# Patient Record
Sex: MALE | Race: WHITE | Employment: OTHER | URBAN - METROPOLITAN AREA
[De-identification: names, ages, dates, MRNs, and addresses within clinical notes are randomized per-mention and may not be internally consistent; named-entity substitution may affect disease eponyms.]

---

## 2018-08-10 PROBLEM — M24.20 LIGAMENT LAXITY: Status: ACTIVE | Noted: 2018-08-10

## 2018-08-10 PROBLEM — Z96.652 STATUS POST LEFT KNEE REPLACEMENT: Status: ACTIVE | Noted: 2018-08-10

## 2021-05-25 ENCOUNTER — HOSPITAL ENCOUNTER (EMERGENCY)
Age: 66
Discharge: HOME | End: 2021-05-25
Payer: COMMERCIAL

## 2021-05-25 ENCOUNTER — APPOINTMENT (OUTPATIENT)
Dept: CT IMAGING | Age: 66
End: 2021-05-25
Payer: COMMERCIAL

## 2021-05-25 VITALS
TEMPERATURE: 97.3 F | SYSTOLIC BLOOD PRESSURE: 127 MMHG | WEIGHT: 160 LBS | HEIGHT: 69 IN | DIASTOLIC BLOOD PRESSURE: 88 MMHG | HEART RATE: 98 BPM | OXYGEN SATURATION: 94 % | BODY MASS INDEX: 23.7 KG/M2 | RESPIRATION RATE: 16 BRPM
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
## 2021-05-25 DIAGNOSIS — S01.01XA LACERATION OF SCALP, INITIAL ENCOUNTER: ICD-10-CM

## 2021-05-25 DIAGNOSIS — S09.90XA CLOSED HEAD INJURY, INITIAL ENCOUNTER: Primary | ICD-10-CM

## 2021-05-25 PROCEDURE — 72125 CT NECK SPINE W/O DYE: CPT

## 2021-05-25 PROCEDURE — 6360000002 HC RX W HCPCS: Performed by: NURSE PRACTITIONER

## 2021-05-25 PROCEDURE — 90471 IMMUNIZATION ADMIN: CPT | Performed by: NURSE PRACTITIONER

## 2021-05-25 PROCEDURE — 70450 CT HEAD/BRAIN W/O DYE: CPT

## 2021-05-25 PROCEDURE — 2500000003 HC RX 250 WO HCPCS: Performed by: NURSE PRACTITIONER

## 2021-05-25 PROCEDURE — 96372 THER/PROPH/DIAG INJ SC/IM: CPT

## 2021-05-25 PROCEDURE — 90715 TDAP VACCINE 7 YRS/> IM: CPT | Performed by: NURSE PRACTITIONER

## 2021-05-25 PROCEDURE — 6370000000 HC RX 637 (ALT 250 FOR IP): Performed by: NURSE PRACTITIONER

## 2021-05-25 PROCEDURE — 12002 RPR S/N/AX/GEN/TRNK2.6-7.5CM: CPT

## 2021-05-25 PROCEDURE — 99283 EMERGENCY DEPT VISIT LOW MDM: CPT

## 2021-05-25 RX ORDER — DIAPER,BRIEF,INFANT-TODD,DISP
EACH MISCELLANEOUS ONCE
Status: COMPLETED | OUTPATIENT
Start: 2021-05-25 | End: 2021-05-25

## 2021-05-25 RX ORDER — LIDOCAINE HYDROCHLORIDE AND EPINEPHRINE 10; 10 MG/ML; UG/ML
20 INJECTION, SOLUTION INFILTRATION; PERINEURAL ONCE
Status: COMPLETED | OUTPATIENT
Start: 2021-05-25 | End: 2021-05-25

## 2021-05-25 RX ADMIN — Medication: at 21:05

## 2021-05-25 RX ADMIN — TETANUS TOXOID, REDUCED DIPHTHERIA TOXOID AND ACELLULAR PERTUSSIS VACCINE, ADSORBED 0.5 ML: 5; 2.5; 8; 8; 2.5 SUSPENSION INTRAMUSCULAR at 21:05

## 2021-05-25 RX ADMIN — LIDOCAINE HYDROCHLORIDE,EPINEPHRINE BITARTRATE 20 ML: 10; .01 INJECTION, SOLUTION INFILTRATION; PERINEURAL at 21:04

## 2021-05-25 ASSESSMENT — PAIN SCALES - GENERAL
PAINLEVEL_OUTOF10: 1
PAINLEVEL_OUTOF10: 1

## 2021-05-25 ASSESSMENT — PAIN DESCRIPTION - LOCATION: LOCATION: HEAD

## 2021-05-26 NOTE — ED PROVIDER NOTES
laceration of the frontal scalp, which measures 3.5cm. It is a partial thickness laceration. There is no evidence of foreign body or gross contamination. Neurologic: GCS 15, cranial nerves II through XII grossly intact. No acute neurovascular deficit noted. Speech clear and coherent strength strong and equal bilaterally  Psych: Normal Affect      ------------------------------ ED COURSE/MEDICAL DECISION MAKING----------------------  Medications   lidocaine-EPINEPHrine 1 %-1:[id number] injection 20 mL (20 mLs Intradermal Given 5/25/21 2104)   Tetanus-Diphth-Acell Pertussis (BOOSTRIX) injection 0.5 mL (0.5 mLs Intramuscular Given 5/25/21 2105)   bacitracin zinc ointment ( Topical Given 5/25/21 2105)             LACERATION REPAIR  PROCEDURE NOTE:  Unless otherwise indicated, this procedure was performed by Danitza Norton CNP       Laceration #: 1. Location: Frontal Scalp   Length: 3.5cm. The wound area was irrigated with sterile saline, cleansend with shur-clens and draped in a sterile fashion. The wound area was anesthetized with Lidocaine 1% with epinephrine. WOUND COMPLEXITY:    Debridement: partial thickness and None. Undermining: partial thickness and None. Wound Margins Revised: yes. Flaps Aligned: yes. The wound was explored with the following results No foreign bodies found, no foreign body or tendon injury seen. The wound was closed with staples. Dressing:  bacitracin was placed. Total number staples: 7      Medical Decision Making: Plan will be for imaging will also perform laceration repair, patient with CT scan of the brain and CT cervical spine both completely unremarkable. Laceration repair completed by this provider, 7 staples placed to frontal scalp. They were educated on daily wound care, signs symptoms of infection and good follow-up care and staple removal.  Patient able to ambulate easily and independently, no neurovascular deficit noted.   Patient expressed understanding of good follow-up care as well as when to return back to the emergency department. Patient safely discharged home        Counseling: The emergency provider has spoken with the patient and discussed todays results, in addition to providing specific details for the plan of care and counseling regarding the diagnosis and prognosis. Questions are answered at this time and they are agreeable with the plan.      --------------------------------- IMPRESSION AND DISPOSITION ---------------------------------    IMPRESSION  1. Closed head injury, initial encounter    2. Fall, initial encounter    3.  Laceration of scalp, initial encounter        DISPOSITION  Disposition: Discharge to home  Patient condition is good         ELIZABETH Castaneda CNP  05/26/21 6277

## 2024-11-18 ENCOUNTER — APPOINTMENT (OUTPATIENT)
Dept: PRIMARY CARE | Facility: CLINIC | Age: 69
End: 2024-11-18
Payer: MEDICARE

## 2024-11-18 VITALS
DIASTOLIC BLOOD PRESSURE: 90 MMHG | WEIGHT: 238 LBS | HEIGHT: 69 IN | HEART RATE: 92 BPM | TEMPERATURE: 97.8 F | BODY MASS INDEX: 35.25 KG/M2 | SYSTOLIC BLOOD PRESSURE: 180 MMHG | RESPIRATION RATE: 16 BRPM

## 2024-11-18 DIAGNOSIS — Z00.01 ENCOUNTER FOR GENERAL ADULT MEDICAL EXAMINATION WITH ABNORMAL FINDINGS: Primary | ICD-10-CM

## 2024-11-18 DIAGNOSIS — Z78.9 ALCOHOL USE: ICD-10-CM

## 2024-11-18 DIAGNOSIS — E78.5 HYPERLIPIDEMIA, UNSPECIFIED HYPERLIPIDEMIA TYPE: ICD-10-CM

## 2024-11-18 DIAGNOSIS — Z85.47 HX OF TESTICULAR CANCER: ICD-10-CM

## 2024-11-18 DIAGNOSIS — Z11.59 ENCOUNTER FOR HCV SCREENING TEST FOR LOW RISK PATIENT: ICD-10-CM

## 2024-11-18 DIAGNOSIS — E55.9 VITAMIN D DEFICIENCY: ICD-10-CM

## 2024-11-18 DIAGNOSIS — I10 HYPERTENSION, UNSPECIFIED TYPE: ICD-10-CM

## 2024-11-18 DIAGNOSIS — N40.1 BENIGN PROSTATIC HYPERPLASIA WITH URINARY FREQUENCY: ICD-10-CM

## 2024-11-18 DIAGNOSIS — R35.0 BENIGN PROSTATIC HYPERPLASIA WITH URINARY FREQUENCY: ICD-10-CM

## 2024-11-18 DIAGNOSIS — K21.9 GASTROESOPHAGEAL REFLUX DISEASE, UNSPECIFIED WHETHER ESOPHAGITIS PRESENT: ICD-10-CM

## 2024-11-18 DIAGNOSIS — I35.0 NONRHEUMATIC AORTIC (VALVE) STENOSIS: ICD-10-CM

## 2024-11-18 PROBLEM — M24.20 LIGAMENT LAXITY: Status: ACTIVE | Noted: 2018-08-10

## 2024-11-18 PROCEDURE — 1036F TOBACCO NON-USER: CPT | Performed by: FAMILY MEDICINE

## 2024-11-18 PROCEDURE — 3080F DIAST BP >= 90 MM HG: CPT | Performed by: FAMILY MEDICINE

## 2024-11-18 PROCEDURE — 3008F BODY MASS INDEX DOCD: CPT | Performed by: FAMILY MEDICINE

## 2024-11-18 PROCEDURE — 1159F MED LIST DOCD IN RCRD: CPT | Performed by: FAMILY MEDICINE

## 2024-11-18 PROCEDURE — G2211 COMPLEX E/M VISIT ADD ON: HCPCS | Performed by: FAMILY MEDICINE

## 2024-11-18 PROCEDURE — 3077F SYST BP >= 140 MM HG: CPT | Performed by: FAMILY MEDICINE

## 2024-11-18 PROCEDURE — 99204 OFFICE O/P NEW MOD 45 MIN: CPT | Performed by: FAMILY MEDICINE

## 2024-11-18 PROCEDURE — 1160F RVW MEDS BY RX/DR IN RCRD: CPT | Performed by: FAMILY MEDICINE

## 2024-11-18 RX ORDER — OMEPRAZOLE 40 MG/1
CAPSULE, DELAYED RELEASE ORAL
COMMUNITY
Start: 2024-11-03 | End: 2024-11-18 | Stop reason: SDUPTHER

## 2024-11-18 RX ORDER — LOSARTAN POTASSIUM 100 MG/1
100 TABLET ORAL DAILY
Qty: 90 TABLET | Refills: 3 | Status: SHIPPED | OUTPATIENT
Start: 2024-11-18 | End: 2025-11-18

## 2024-11-18 RX ORDER — ESCITALOPRAM OXALATE 10 MG/1
1 TABLET ORAL
COMMUNITY
Start: 2024-10-23

## 2024-11-18 RX ORDER — LOSARTAN POTASSIUM 100 MG/1
100 TABLET ORAL DAILY
COMMUNITY
End: 2024-11-18 | Stop reason: SDUPTHER

## 2024-11-18 RX ORDER — TAMSULOSIN HYDROCHLORIDE 0.4 MG/1
0.4 CAPSULE ORAL DAILY
COMMUNITY

## 2024-11-18 RX ORDER — OMEPRAZOLE 40 MG/1
40 CAPSULE, DELAYED RELEASE ORAL
Qty: 90 CAPSULE | Refills: 3 | Status: SHIPPED | OUTPATIENT
Start: 2024-11-18 | End: 2025-11-18

## 2024-11-18 RX ORDER — PRAVASTATIN SODIUM 20 MG/1
1 TABLET ORAL
COMMUNITY
Start: 2024-10-23

## 2024-11-18 RX ORDER — ALBUTEROL SULFATE 90 UG/1
INHALANT RESPIRATORY (INHALATION)
COMMUNITY
Start: 2024-10-24

## 2024-11-18 RX ORDER — METOPROLOL SUCCINATE 25 MG/1
TABLET, EXTENDED RELEASE ORAL
COMMUNITY
Start: 2024-10-23

## 2024-11-18 ASSESSMENT — LIFESTYLE VARIABLES
HOW OFTEN DO YOU HAVE SIX OR MORE DRINKS ON ONE OCCASION: DAILY OR ALMOST DAILY
HOW MANY STANDARD DRINKS CONTAINING ALCOHOL DO YOU HAVE ON A TYPICAL DAY: PATIENT DECLINED
AUDIT-C TOTAL SCORE: -1
SKIP TO QUESTIONS 9-10: 0
HOW OFTEN DO YOU HAVE A DRINK CONTAINING ALCOHOL: 4 OR MORE TIMES A WEEK

## 2024-11-18 NOTE — PATIENT INSTRUCTIONS
USE [email or web address].  CALL FOR NEEDS [phone].   KEEP ON MEDICATIONS  KEEP SPECIALTY APPOINTMENTS.  DR ESTRADA & DR FRAUSTO.    GET FASTED LABS.    KEEP UP WITH YOUR FLU AND COVID VACCINATIONS.

## 2024-11-18 NOTE — PROGRESS NOTES
"Subjective   Patient ID: Juan Hayden is a 68 y.o. male who presents for New Patient Visit.    HPI   NEW PT   [facility] DR MERLYN HAMILTON AND WANTS A NEW PCP.    SPECIALIST:    CARDIO DR FRAUSTO:  REMOTE VISIT   DR JOSE JUAN [facility] ON[facility] dx TESTICULAR CANCER 1985.            Review of Systems    Objective   /90 (BP Location: Left arm, Patient Position: Sitting)   Pulse 92   Temp 36.6 °C (97.8 °F) (Temporal)   Resp 16   Ht 1.753 m (5' 9\")   Wt 108 kg (238 lb)   BMI 35.15 kg/m²     Physical Exam    Assessment/Plan          "

## 2025-02-21 ENCOUNTER — APPOINTMENT (OUTPATIENT)
Dept: PRIMARY CARE | Facility: CLINIC | Age: 70
End: 2025-02-21
Payer: MEDICARE

## 2025-02-21 DIAGNOSIS — J40 BRONCHITIS: Primary | ICD-10-CM

## 2025-02-21 PROCEDURE — 99213 OFFICE O/P EST LOW 20 MIN: CPT | Performed by: FAMILY MEDICINE

## 2025-02-21 RX ORDER — AZITHROMYCIN 250 MG/1
TABLET, FILM COATED ORAL
Qty: 6 TABLET | Refills: 0 | Status: SHIPPED | OUTPATIENT
Start: 2025-02-21 | End: 2025-02-21 | Stop reason: ENTERED-IN-ERROR

## 2025-02-21 RX ORDER — ALBUTEROL SULFATE 90 UG/1
2 INHALANT RESPIRATORY (INHALATION) EVERY 4 HOURS PRN
Qty: 18 G | Refills: 11 | Status: SHIPPED | OUTPATIENT
Start: 2025-02-21 | End: 2025-02-21 | Stop reason: ENTERED-IN-ERROR

## 2025-02-21 RX ORDER — METHYLPREDNISOLONE 4 MG/1
TABLET ORAL
Qty: 21 TABLET | Refills: 0 | Status: SHIPPED | OUTPATIENT
Start: 2025-02-21 | End: 2025-02-21 | Stop reason: ENTERED-IN-ERROR

## 2025-02-21 NOTE — PROGRESS NOTES
Subjective   Patient ID: Juna Hayden is a 69 y.o. male who presents for No chief complaint on file..    HPI         Review of Systems     Objective   There were no vitals taken for this visit.    Physical Exam    Assessment/Plan   Assessment & Plan  Bronchitis           TODAY'S NOTE IS IN ERROR - PT NOT SEEN NOT TREATED VISIT WAS CANCELLED - PGS 2/21/25: 17:54

## 2025-02-25 ENCOUNTER — APPOINTMENT (OUTPATIENT)
Dept: PRIMARY CARE | Facility: CLINIC | Age: 70
End: 2025-02-25
Payer: MEDICARE

## 2025-02-25 VITALS
RESPIRATION RATE: 16 BRPM | SYSTOLIC BLOOD PRESSURE: 160 MMHG | DIASTOLIC BLOOD PRESSURE: 88 MMHG | HEART RATE: 65 BPM | TEMPERATURE: 97.8 F

## 2025-02-25 DIAGNOSIS — J40 BRONCHITIS: ICD-10-CM

## 2025-02-25 DIAGNOSIS — I10 HYPERTENSION, UNSPECIFIED TYPE: Primary | Chronic | ICD-10-CM

## 2025-02-25 DIAGNOSIS — Z78.9 ALCOHOL USE: ICD-10-CM

## 2025-02-25 DIAGNOSIS — Z85.47 HX OF TESTICULAR CANCER: ICD-10-CM

## 2025-02-25 PROCEDURE — 99214 OFFICE O/P EST MOD 30 MIN: CPT | Performed by: FAMILY MEDICINE

## 2025-02-25 PROCEDURE — 1159F MED LIST DOCD IN RCRD: CPT | Performed by: FAMILY MEDICINE

## 2025-02-25 PROCEDURE — G2211 COMPLEX E/M VISIT ADD ON: HCPCS | Performed by: FAMILY MEDICINE

## 2025-02-25 PROCEDURE — 3079F DIAST BP 80-89 MM HG: CPT | Performed by: FAMILY MEDICINE

## 2025-02-25 PROCEDURE — 1036F TOBACCO NON-USER: CPT | Performed by: FAMILY MEDICINE

## 2025-02-25 PROCEDURE — 1160F RVW MEDS BY RX/DR IN RCRD: CPT | Performed by: FAMILY MEDICINE

## 2025-02-25 PROCEDURE — 3077F SYST BP >= 140 MM HG: CPT | Performed by: FAMILY MEDICINE

## 2025-02-25 RX ORDER — AMLODIPINE BESYLATE 5 MG/1
5 TABLET ORAL
COMMUNITY
Start: 2025-01-16

## 2025-02-25 RX ORDER — HYDROCHLOROTHIAZIDE 25 MG/1
25 TABLET ORAL DAILY
Qty: 90 TABLET | Refills: 3 | Status: SHIPPED | OUTPATIENT
Start: 2025-02-25 | End: 2026-02-25

## 2025-02-25 ASSESSMENT — PATIENT HEALTH QUESTIONNAIRE - PHQ9
9. THOUGHTS THAT YOU WOULD BE BETTER OFF DEAD, OR OF HURTING YOURSELF: NOT AT ALL
6. FEELING BAD ABOUT YOURSELF - OR THAT YOU ARE A FAILURE OR HAVE LET YOURSELF OR YOUR FAMILY DOWN: SEVERAL DAYS
2. FEELING DOWN, DEPRESSED OR HOPELESS: MORE THAN HALF THE DAYS
4. FEELING TIRED OR HAVING LITTLE ENERGY: MORE THAN HALF THE DAYS
3. TROUBLE FALLING OR STAYING ASLEEP OR SLEEPING TOO MUCH: NOT AT ALL
7. TROUBLE CONCENTRATING ON THINGS, SUCH AS READING THE NEWSPAPER OR WATCHING TELEVISION: NOT AT ALL
SUM OF ALL RESPONSES TO PHQ9 QUESTIONS 1 AND 2: 3
1. LITTLE INTEREST OR PLEASURE IN DOING THINGS: SEVERAL DAYS
SUM OF ALL RESPONSES TO PHQ QUESTIONS 1-9: 7
5. POOR APPETITE OR OVEREATING: SEVERAL DAYS
8. MOVING OR SPEAKING SO SLOWLY THAT OTHER PEOPLE COULD HAVE NOTICED. OR THE OPPOSITE, BEING SO FIGETY OR RESTLESS THAT YOU HAVE BEEN MOVING AROUND A LOT MORE THAN USUAL: NOT AT ALL

## 2025-02-25 NOTE — PROGRESS NOTES
Subjective   Patient ID: Juan Hayden is a 69 y.o. male who presents for Follow-up (Fu from cardiologist and oncologist ).    HPI   Follow-up (Fu from cardiologist and oncologist ).  TESTICULAR MALIGNANCY.      LABS AND TESTS TO REVIEW.      REPORTS CONSTANT QD LUGO WITH THE NURSING HOME TRIGGERS HIS HTN.      BRONCHITIS 2.21.2025.  BETTER NOW, DRINKING A LOT LATELY.  WORRIED HE DRANK HIMSELF INTO AN ULCER..      NEW ONCO - ABRAMOVICH TO NEW ONCO:  DR JETT.    TESTICULAR CANCER S/P RADTX IN REMISSION.      CARDIO:    LUIS ENRIQUE BLANCAS.  /78: AMLODIPINE.    C/O INSANELY DIZZY WITH LAZY EYE.  BLUE SCREEN EXPOSURES?    NO FALLS.      $5000 ALKALINE WATER IN HOME.        LABS: eGFR 63, Cr 1.26;  ABNL UA, LOW D3; NEEDS VIT D3.       1--HTN NOT CONTROLLED DESPITE BB CCB ARB. tba HCTZ    2--DIZZINESS    3--TESTICULAR CANCER IN REMISSION  4--ALCOHOLISM PLEASE REDUCE USE...  5--NEAR FALLS BUT NO REAL FALLS, HX OF NEURO WORKUPS.   6--GERD AND ULCER RISKS TO AVOID THE BAD 5 GI IRRITANTS.       PT IS ON CCB BB ARB STATIN PPI PPX   NO MELENA.        Review of Systems   All other systems reviewed and are negative.      Objective   /88 (BP Location: Left arm, Patient Position: Lying)   Pulse 65   Temp 36.6 °C (97.8 °F) (Temporal)   Resp 16     Physical Exam  Vitals and nursing note reviewed.   Constitutional:       Appearance: Normal appearance.      Comments: LAZY EYE AND O/W WELL APPEARING WM.  NT NAD AND OBESE ABD NO HSM.     HENT:      Head: Normocephalic.      Right Ear: Tympanic membrane, ear canal and external ear normal.      Left Ear: Tympanic membrane, ear canal and external ear normal.      Nose: Nose normal.      Mouth/Throat:      Mouth: Mucous membranes are moist.      Pharynx: Oropharynx is clear.   Eyes:      Conjunctiva/sclera: Conjunctivae normal.      Pupils: Pupils are equal, round, and reactive to light.   Cardiovascular:      Rate and Rhythm: Normal rate and regular rhythm.      Pulses:  Normal pulses.      Heart sounds: Normal heart sounds.   Pulmonary:      Effort: Pulmonary effort is normal.      Breath sounds: Normal breath sounds.   Abdominal:      General: Bowel sounds are normal.      Palpations: Abdomen is soft.   Musculoskeletal:         General: Normal range of motion.      Cervical back: Normal range of motion and neck supple.   Skin:     General: Skin is warm and dry.   Neurological:      General: No focal deficit present.      Mental Status: Mental status is at baseline.   Psychiatric:         Mood and Affect: Mood normal.         Behavior: Behavior normal.         Thought Content: Thought content normal.         Judgment: Judgment normal.         Assessment/Plan   Assessment & Plan  Bronchitis         Alcohol use         Hx of testicular cancer         Hypertension, unspecified type    Orders:    hydroCHLOROthiazide (HYDRODiuril) 25 mg tablet; Take 1 tablet (25 mg) by mouth once daily.    Follow Up In Primary Care - Established; Future    Follow Up In Primary Care - Medicare Annual; Future    Follow Up In Primary Care - Nurse Visit; Future

## 2025-02-25 NOTE — ASSESSMENT & PLAN NOTE
Orders:    hydroCHLOROthiazide (HYDRODiuril) 25 mg tablet; Take 1 tablet (25 mg) by mouth once daily.    Follow Up In Primary Care - Established; Future    Follow Up In Primary Care - Medicare Annual; Future    Follow Up In Primary Care - Nurse Visit; Future

## 2025-02-25 NOTE — PATIENT INSTRUCTIONS
"USE MYCHART.  CALL FOR NEEDS [phone].   KEEP ON MEDICATIONS  KEEP SPECIALTY APPOINTMENTS.    1--BLOOD PRESSURE NOT CONTROLLED DESPITE METOPROLOL, AMLODIPINE, LOSARTAN.   WE ADDED HYDROCHLOROTHIAZIDE A MILD DIURETIC OR \"WATER PILL TO CONTINUE TO LOWER YU9OR BLOOD PRESSURE TO GAOL < 130/80.  2--DIZZINESS: NEAR FALLS BUT NO REAL FALLS, HX OF NEURO WORKUPS.     3--TESTICULAR CANCER IN REMISSION  4--ALCOHOLISM PLEASE REDUCE USE...  5--GERD AND ULCER RISKS TO AVOID THE BAD 5 GI IRRITANTS.       BLOOD PRESSURE CHECK ON ALL MEDS AND NEW HCTZ DOSE.         "

## 2025-03-11 ENCOUNTER — APPOINTMENT (OUTPATIENT)
Dept: PRIMARY CARE | Facility: CLINIC | Age: 70
End: 2025-03-11
Payer: MEDICARE

## 2025-03-11 VITALS
DIASTOLIC BLOOD PRESSURE: 70 MMHG | HEART RATE: 71 BPM | OXYGEN SATURATION: 95 % | TEMPERATURE: 98 F | HEIGHT: 69 IN | SYSTOLIC BLOOD PRESSURE: 132 MMHG | BODY MASS INDEX: 35.15 KG/M2

## 2025-03-11 DIAGNOSIS — K29.60 OTHER SPECIFIED GASTRITIS, PRESENCE OF BLEEDING UNSPECIFIED, UNSPECIFIED CHRONICITY: ICD-10-CM

## 2025-03-11 DIAGNOSIS — G47.33 OSA (OBSTRUCTIVE SLEEP APNEA): ICD-10-CM

## 2025-03-11 DIAGNOSIS — K21.9 GASTROESOPHAGEAL REFLUX DISEASE, UNSPECIFIED WHETHER ESOPHAGITIS PRESENT: ICD-10-CM

## 2025-03-11 DIAGNOSIS — R10.33 PERIUMBILICAL ABDOMINAL PAIN: Primary | ICD-10-CM

## 2025-03-11 DIAGNOSIS — E66.01 OBESITY, MORBID (MULTI): ICD-10-CM

## 2025-03-11 PROCEDURE — 3075F SYST BP GE 130 - 139MM HG: CPT | Performed by: FAMILY MEDICINE

## 2025-03-11 PROCEDURE — 1036F TOBACCO NON-USER: CPT | Performed by: FAMILY MEDICINE

## 2025-03-11 PROCEDURE — 99214 OFFICE O/P EST MOD 30 MIN: CPT | Performed by: FAMILY MEDICINE

## 2025-03-11 PROCEDURE — 1160F RVW MEDS BY RX/DR IN RCRD: CPT | Performed by: FAMILY MEDICINE

## 2025-03-11 PROCEDURE — 1159F MED LIST DOCD IN RCRD: CPT | Performed by: FAMILY MEDICINE

## 2025-03-11 PROCEDURE — 3078F DIAST BP <80 MM HG: CPT | Performed by: FAMILY MEDICINE

## 2025-03-11 RX ORDER — SUCRALFATE 1 G/1
1 TABLET ORAL
Qty: 120 TABLET | Refills: 2 | Status: SHIPPED | OUTPATIENT
Start: 2025-03-11 | End: 2026-03-11

## 2025-03-11 ASSESSMENT — ENCOUNTER SYMPTOMS: ABDOMINAL PAIN: 1

## 2025-03-11 NOTE — PROGRESS NOTES
"Subjective   Patient ID: Juan Bhagat is a 69 y.o. male who presents for Abdominal Pain (Possible ulcer) and Follow-up (Needs cpap supplies order and discuss getting new sleep study ).    Abdominal Pain       Abdominal Pain (Possible ulcer) and Follow-up (Needs cpap supplies order and discuss getting new sleep study ).    ETOH FREE SINCE 01 MARCH 2025.    MOM WAS ON CARAFATE ASKS FOR SAME.    FEELS SXS ARE BETTER     THE BAD 5 GI IRRITANTS:  GLENNY CAFF ETOH SPICY NSAID.    OBESITY CAN BE MADE WORSE SELF MEDICATING WITH FOOD TO EASE GI PAINS.  NEEDS GI REFERRAL.    NO BLOODY EMESIS.    NO MELANOTIC STOOLS.     Gi 2021:  dr flores:  GASTRITIS, NO H PYLORI: OK COLON POLYP.     Libby Flores MD               Pathology Report    Name JUAN BHAGAT    Accession #: [id number]  Pathologist: NICHO CASTRO MD  Date of Procedure: 3/2/2021  Date Received: 3/2/2021  Date Reported 3/4/2021  Submitting Physician: LIBBY FLORES MD  Location: [facility]  Copy To/Referring/Attending:  MERLYN MENDIOLA,DO Other External #    FINAL DIAGNOSIS  A. STOMACH, BODY, ANTRUM, COLD FORCEP BIOPSY:  -- GASTRIC ANTRAL AND OXYNTIC MUCOSA WITH CHRONIC NON-SPECIFIC GASTRITIS.  -- NO HELICOBACTER PYLORI ORGANISMS IDENTIFIED ON H&E STAIN.    B. COLON, SIGMOID, POLYP, COLD SNARE POLYPECTOMY:  -- HYPERPLASTIC POLYP.             Review of Systems   Gastrointestinal:  Positive for abdominal pain.   All other systems reviewed and are negative.      Objective   /70   Pulse 71   Temp 36.7 °C (98 °F)   Ht 1.753 m (5' 9\")   SpO2 95%   BMI 35.15 kg/m²     Physical Exam  Vitals and nursing note reviewed.   Constitutional:       Appearance: Normal appearance.   HENT:      Head: Normocephalic.      Right Ear: Tympanic membrane, ear canal and external ear normal.      Left Ear: Tympanic membrane, ear canal and external ear normal.      Nose: Nose normal.      Mouth/Throat:      Mouth: Mucous membranes are moist.      Pharynx: Oropharynx is " clear.   Eyes:      Conjunctiva/sclera: Conjunctivae normal.      Pupils: Pupils are equal, round, and reactive to light.   Cardiovascular:      Rate and Rhythm: Normal rate and regular rhythm.      Pulses: Normal pulses.      Heart sounds: Normal heart sounds.   Pulmonary:      Effort: Pulmonary effort is normal.      Breath sounds: Normal breath sounds.   Abdominal:      General: Bowel sounds are normal.      Palpations: Abdomen is soft.   Musculoskeletal:         General: Normal range of motion.      Cervical back: Normal range of motion and neck supple.   Skin:     General: Skin is warm and dry.   Neurological:      General: No focal deficit present.      Mental Status: Mental status is at baseline.   Psychiatric:         Mood and Affect: Mood normal.         Behavior: Behavior normal.         Thought Content: Thought content normal.         Judgment: Judgment normal.         Assessment/Plan   Assessment & Plan  Periumbilical abdominal pain    Orders:    sucralfate (Carafate) 1 gram tablet; Take 1 tablet (1 g) by mouth 4 times a day before meals.    Referral to Gastroenterology; Future    Obesity, morbid (Multi)         DAYRON (obstructive sleep apnea)    Orders:    Positive Airway Pressure (PAP) Therapy    Gastroesophageal reflux disease, unspecified whether esophagitis present    Orders:    Referral to Gastroenterology; Future    Other specified gastritis, presence of bleeding unspecified, unspecified chronicity    Orders:    Referral to Gastroenterology; Future    NEEDS CPAP SUPPLY ORDER FROM [employer]    LAST SLEEP STUDY > 5 YRS.    GOOD COMPLIANCE AND REPORTS BENEFIT ON DEVICE.

## 2025-03-11 NOTE — PATIENT INSTRUCTIONS
USE [employer].  CALL FOR NEEDS [phone].   KEEP ON MEDICATIONS  KEEP SPECIALTY APPOINTMENTS.    AVOID THE BAD 5 GI IRRITANTS:  NICOTINE, CAFFEINE, ALCOHOL, SPICY FOODS. NSAIDs [ASPIRIN, MOTRIN, ALEVE, ect].  TRIAL OF SUCRALFATE 1000 MG BY MOUTH FOUR TIMES A DAY.    KEEP ON OMEPRAZOLE.

## 2025-04-22 ENCOUNTER — TELEPHONE (OUTPATIENT)
Dept: PRIMARY CARE | Facility: CLINIC | Age: 70
End: 2025-04-22
Payer: MEDICARE

## 2025-04-22 DIAGNOSIS — N40.1 BENIGN PROSTATIC HYPERPLASIA WITH URINARY FREQUENCY: ICD-10-CM

## 2025-04-22 DIAGNOSIS — R35.0 BENIGN PROSTATIC HYPERPLASIA WITH URINARY FREQUENCY: ICD-10-CM

## 2025-04-22 DIAGNOSIS — E78.5 HYPERLIPIDEMIA, UNSPECIFIED HYPERLIPIDEMIA TYPE: ICD-10-CM

## 2025-04-22 DIAGNOSIS — E78.5 HYPERLIPIDEMIA, UNSPECIFIED HYPERLIPIDEMIA TYPE: Primary | ICD-10-CM

## 2025-04-22 RX ORDER — TAMSULOSIN HYDROCHLORIDE 0.4 MG/1
0.4 CAPSULE ORAL DAILY
Qty: 90 CAPSULE | Refills: 3 | Status: SHIPPED | OUTPATIENT
Start: 2025-04-22 | End: 2026-04-22

## 2025-04-22 RX ORDER — TAMSULOSIN HYDROCHLORIDE 0.4 MG/1
0.4 CAPSULE ORAL DAILY
Qty: 90 CAPSULE | Refills: 3 | Status: SHIPPED | OUTPATIENT
Start: 2025-04-22 | End: 2025-04-22 | Stop reason: SDUPTHER

## 2025-04-22 RX ORDER — PRAVASTATIN SODIUM 20 MG/1
20 TABLET ORAL
Qty: 90 TABLET | Refills: 3 | Status: SHIPPED | OUTPATIENT
Start: 2025-04-22 | End: 2025-04-22 | Stop reason: SDUPTHER

## 2025-04-22 RX ORDER — PRAVASTATIN SODIUM 20 MG/1
20 TABLET ORAL
Qty: 90 TABLET | Refills: 3 | Status: SHIPPED | OUTPATIENT
Start: 2025-04-22 | End: 2026-04-22

## 2025-04-22 NOTE — TELEPHONE ENCOUNTER
RX refills for pravastatin (Pravachol) 20 mg tablet and tamsulosin (Flomax) 0.4 mg 24 hr capsule . To [address]

## 2025-05-12 ENCOUNTER — TELEPHONE (OUTPATIENT)
Dept: PRIMARY CARE | Facility: CLINIC | Age: 70
End: 2025-05-12
Payer: MEDICARE

## 2025-05-13 DIAGNOSIS — F41.8 ANXIOUS DEPRESSION: Primary | ICD-10-CM

## 2025-05-13 DIAGNOSIS — F10.90 ALCOHOL USE: ICD-10-CM

## 2025-05-13 RX ORDER — ESCITALOPRAM OXALATE 10 MG/1
10 TABLET ORAL
Qty: 90 TABLET | Refills: 3 | Status: SHIPPED | OUTPATIENT
Start: 2025-05-13 | End: 2026-05-13

## 2025-05-29 ENCOUNTER — APPOINTMENT (OUTPATIENT)
Dept: PRIMARY CARE | Facility: CLINIC | Age: 70
End: 2025-05-29
Payer: MEDICARE

## 2025-06-26 ENCOUNTER — TELEPHONE (OUTPATIENT)
Dept: PRIMARY CARE | Facility: CLINIC | Age: 70
End: 2025-06-26
Payer: MEDICARE

## 2025-06-26 DIAGNOSIS — R10.33 PERIUMBILICAL ABDOMINAL PAIN: ICD-10-CM

## 2025-06-26 DIAGNOSIS — K21.9 GASTROESOPHAGEAL REFLUX DISEASE, UNSPECIFIED WHETHER ESOPHAGITIS PRESENT: Primary | ICD-10-CM

## 2025-06-26 DIAGNOSIS — F10.90 ALCOHOL USE: ICD-10-CM

## 2025-06-26 DIAGNOSIS — K29.60 OTHER SPECIFIED GASTRITIS, PRESENCE OF BLEEDING UNSPECIFIED, UNSPECIFIED CHRONICITY: ICD-10-CM

## 2025-06-26 NOTE — TELEPHONE ENCOUNTER
Patient does not want to go to [facility] for GI, asking if you can change the referral to someone at  or [facility]?

## 2025-06-30 ENCOUNTER — APPOINTMENT (OUTPATIENT)
Dept: GASTROENTEROLOGY | Facility: CLINIC | Age: 70
End: 2025-06-30
Payer: MEDICARE

## 2025-06-30 VITALS — HEART RATE: 77 BPM | OXYGEN SATURATION: 96 % | HEIGHT: 69 IN | WEIGHT: 240 LBS | BODY MASS INDEX: 35.55 KG/M2

## 2025-06-30 DIAGNOSIS — K21.9 GASTROESOPHAGEAL REFLUX DISEASE, UNSPECIFIED WHETHER ESOPHAGITIS PRESENT: ICD-10-CM

## 2025-06-30 DIAGNOSIS — F10.29 ALCOHOL DEPENDENCE WITH UNSPECIFIED ALCOHOL-INDUCED DISORDER: Primary | ICD-10-CM

## 2025-06-30 DIAGNOSIS — R10.10 PAIN OF UPPER ABDOMEN: ICD-10-CM

## 2025-06-30 PROCEDURE — 99204 OFFICE O/P NEW MOD 45 MIN: CPT | Performed by: INTERNAL MEDICINE

## 2025-06-30 PROCEDURE — 1159F MED LIST DOCD IN RCRD: CPT | Performed by: INTERNAL MEDICINE

## 2025-06-30 PROCEDURE — 3008F BODY MASS INDEX DOCD: CPT | Performed by: INTERNAL MEDICINE

## 2025-06-30 PROCEDURE — 1036F TOBACCO NON-USER: CPT | Performed by: INTERNAL MEDICINE

## 2025-06-30 ASSESSMENT — ENCOUNTER SYMPTOMS: SHORTNESS OF BREATH: 0

## 2025-06-30 NOTE — PATIENT INSTRUCTIONS
Schedule upper endoscopy. Continue Omeprazole and Sucralfate in the interim. Continue to work on cutting down on alcohol use and ultimately sobriety.

## 2025-06-30 NOTE — PROGRESS NOTES
REASON FOR VISIT:  Abdominal pain   PCP (requesting provider): David Coleman MD.    HPI:  Juan Hayden is a 69 y.o. male with a past medical history of DAYRON, HTN, HLD, and remote history of testicular cancer being evaluated for abdominal pain.    The patient reports he has been drinking alcohol significantly and has struggled with abdominal pain. His abdominal pain is epigastric and LUQ. It has been present persistently for the last 1 month. It worsens after eating. He has been taking Omeprazole for multiple years. He started Sucralfate with improvement as well. He has been drinking alcohol heavily about 1/2 bottle of hard liquor per day. He has been a daily alcohol drinker for the last 1 year due to life stressors related to his mother in a nursing facility. He has been working on cutting down alcohol use and has 2 drinks per day. No NSAID use other than rare aspirin use. Denies dysphagia or odynophagia. No sedation issues. No neck surgeries. No blood thinner. He has DAYRON and utilizes CPAP.     PSurgHx: No abdominal surgeries     FamHx: Mother with Moore's esophagus.    Prior Endoscopy:  -EGD (3/2021): Normal esophagus, mild gastritis (H. Pylori -), normal duodenum.  -Colonoscopy (3/2021): Adequate prep, small sigmoid polyp (HP), medium IH, otherwise normal colon.  -EGD (10/2013): No procedure report available (path showed normal duodenal biopsies).  -Colonoscopy (10/2013): No procedure report available (path showed transverse biopsies with acutely inflamed granulation tissue consistent with inflammatory pseudopolyp and normal sigmoid biopsies).    PAST MEDICAL HISTORY  Medical History[1]    PAST SURGICAL HISTORY  Surgical History[2]    FAMILY HISTORY  Family History[3]    SOCIAL HISTORY   reports that he has never smoked. He has never used smokeless tobacco. He reports current alcohol use. He reports current drug use. Drug: Marijuana.    REVIEW OF SYSTEMS  Review of Systems   Respiratory:  Negative for  "shortness of breath.    Cardiovascular:  Negative for chest pain.   All other systems reviewed and are negative.    A 10+ point review of systems was otherwise negative except as noted and per HPI.    ALLERGIES  Allergies[4]    MEDICATIONS  Current Outpatient Medications   Medication Instructions    amLODIPine (NORVASC) 5 mg, Daily RT    escitalopram (LEXAPRO) 10 mg, oral, Daily (0630)    hydroCHLOROthiazide (HYDRODIURIL) 25 mg, oral, Daily    losartan (COZAAR) 100 mg, oral, Daily    metoprolol succinate XL (Toprol-XL) 25 mg 24 hr tablet TAKE 1 TABLET BY MOUTH EVERYDAY AT BEDTIME ORAL ONCE A DAY 90 DAYS    omeprazole (PRILOSEC) 40 mg, oral, Daily before breakfast, Do not crush or chew.    pravastatin (PRAVACHOL) 20 mg, oral, Daily (0630)    sucralfate (CARAFATE) 1 g, oral, 4 times daily before meals and nightly    tamsulosin (FLOMAX) 0.4 mg, oral, Daily       VITALS  There were no vitals filed for this visit.   There is no height or weight on file to calculate BMI.    PHYSICAL EXAM  CONSTITUTIONAL: NAD, appears stated age  EYES: anicteric sclera, sclera clear  HEAD: normocephalic, atraumatic   NECK: supple   PULMONARY: CTAB  CARDIOVASCULAR: RRR, no M/R/G appreciated   ABDOMEN: soft, NTND, +BS, no rebound or guarding   MUSCULOSKELETAL: no edema  SKIN: no jaundice   PSYCHIATRIC: AOx3, appropriate insight and judgement    LABS  No results found for: \"WBC\", \"HGB\", \"PLT\"  No results found for: \"NA\", \"K\", \"CL\", \"CO2\", \"BUN\", \"CREATININE\", \"CALCIUM\", \"PROT\", \"BILITOT\", \"ALKPHOS\", \"ALT\", \"AST\", \"GLUCOSE\"  No results found for: \"LIPASE\", \"CRP\"    ASSESSMENT/PLAN  Juan Hayden is a 69 y.o. male with a past medical history of DAYRON, HTN, HLD, and remote history of testicular cancer being evaluated for abdominal pain. Patient with persistent epigastric and LUQ abdominal pain in setting of heavy alcohol use. He is taking Omeprazole and Sucralfate with improvement but pain is still persistent. Differential includes PUD, " esophagitis, gastritis, and H. Pylori infection. We will pursue EGD for further evaluation.    -Plan for EGD including biopsies for H. Pylori if otherwise normal  -The procedure(s) including risks/benefits, diet restrictions, prep, and sedation were discussed with the patient  -Continue Omeprazole 40 mg daily and Sucralfate PRN in the interim  -Advised patient regarding cutting down on and sobriety from alcohol    Follow-up will be at the time of the EGD.     Signature: Timur Yepez MD       [1]   Past Medical History:  Diagnosis Date    Alcohol abuse     Aortic stenosis     Depression     GERD (gastroesophageal reflux disease)     Heart disease     Hypertension    [2]   Past Surgical History:  Procedure Laterality Date    COLONOSCOPY      TOTAL HIP ARTHROPLASTY Left     TOTAL KNEE ARTHROPLASTY Left    [3]   Family History  Problem Relation Name Age of Onset    Other (spinal cord) Mother      Kidney failure Father      Stroke Father     [4] No Known Allergies

## 2025-07-16 ENCOUNTER — APPOINTMENT (OUTPATIENT)
Dept: GASTROENTEROLOGY | Facility: EXTERNAL LOCATION | Age: 70
End: 2025-07-16
Payer: MEDICARE

## 2025-07-24 ENCOUNTER — RESULTS FOLLOW-UP (OUTPATIENT)
Dept: PRIMARY CARE | Facility: CLINIC | Age: 70
End: 2025-07-24

## 2025-07-24 ENCOUNTER — APPOINTMENT (OUTPATIENT)
Dept: GASTROENTEROLOGY | Facility: EXTERNAL LOCATION | Age: 70
End: 2025-07-24
Payer: MEDICARE

## 2025-07-24 DIAGNOSIS — K22.89 OTHER SPECIFIED DISEASE OF ESOPHAGUS: Primary | ICD-10-CM

## 2025-07-24 DIAGNOSIS — K31.89 OTHER DISEASES OF STOMACH AND DUODENUM: ICD-10-CM

## 2025-07-24 DIAGNOSIS — R10.10 PAIN OF UPPER ABDOMEN: ICD-10-CM

## 2025-07-24 PROCEDURE — 0753T DGTZ GLS MCRSCP SLD LEVEL IV: CPT

## 2025-07-24 PROCEDURE — 88305 TISSUE EXAM BY PATHOLOGIST: CPT

## 2025-07-24 PROCEDURE — 43239 EGD BIOPSY SINGLE/MULTIPLE: CPT | Performed by: INTERNAL MEDICINE

## 2025-07-24 PROCEDURE — 88305 TISSUE EXAM BY PATHOLOGIST: CPT | Performed by: PATHOLOGY

## 2025-07-28 ENCOUNTER — LAB REQUISITION (OUTPATIENT)
Dept: LAB | Facility: HOSPITAL | Age: 70
End: 2025-07-28
Payer: MEDICARE

## 2025-07-28 DIAGNOSIS — R10.10 UPPER ABDOMINAL PAIN, UNSPECIFIED: ICD-10-CM

## 2025-07-28 NOTE — TELEPHONE ENCOUNTER
----- Message from David Coleman sent at 7/24/2025  5:47 PM EDT -----  GASTRIC IRRITATION w/ BIOPSIES PENDING.    ----- Message -----  From: Cyndie Rueda MA  Sent: 7/24/2025   2:57 PM EDT  To: David Coleman MD

## 2025-08-01 LAB
LABORATORY COMMENT REPORT: NORMAL
PATH REPORT.COMMENTS IMP SPEC: NORMAL
PATH REPORT.FINAL DX SPEC: NORMAL
PATH REPORT.GROSS SPEC: NORMAL
PATH REPORT.RELEVANT HX SPEC: NORMAL
PATH REPORT.TOTAL CANCER: NORMAL